# Patient Record
Sex: MALE | Race: WHITE | NOT HISPANIC OR LATINO | Employment: OTHER | URBAN - METROPOLITAN AREA
[De-identification: names, ages, dates, MRNs, and addresses within clinical notes are randomized per-mention and may not be internally consistent; named-entity substitution may affect disease eponyms.]

---

## 2021-12-06 ENCOUNTER — HOSPITAL ENCOUNTER (EMERGENCY)
Facility: HOSPITAL | Age: 41
Discharge: HOME | End: 2021-12-06
Attending: EMERGENCY MEDICINE

## 2021-12-06 VITALS
TEMPERATURE: 98 F | RESPIRATION RATE: 16 BRPM | HEART RATE: 99 BPM | WEIGHT: 217 LBS | HEIGHT: 73 IN | BODY MASS INDEX: 28.76 KG/M2 | SYSTOLIC BLOOD PRESSURE: 146 MMHG | DIASTOLIC BLOOD PRESSURE: 94 MMHG | OXYGEN SATURATION: 98 %

## 2021-12-06 DIAGNOSIS — S93.402A SPRAIN OF LEFT ANKLE, UNSPECIFIED LIGAMENT, INITIAL ENCOUNTER: Primary | ICD-10-CM

## 2021-12-06 DIAGNOSIS — S80.01XA CONTUSION OF RIGHT KNEE, INITIAL ENCOUNTER: ICD-10-CM

## 2021-12-06 DIAGNOSIS — S09.90XA CLOSED HEAD INJURY, INITIAL ENCOUNTER: ICD-10-CM

## 2021-12-06 DIAGNOSIS — M25.579 ANKLE PAIN: ICD-10-CM

## 2021-12-06 PROCEDURE — 99284 EMERGENCY DEPT VISIT MOD MDM: CPT | Mod: 25

## 2021-12-06 PROCEDURE — 63600175 PHARM REV CODE 636 W HCPCS: Performed by: PHYSICIAN ASSISTANT

## 2021-12-06 PROCEDURE — 90471 IMMUNIZATION ADMIN: CPT | Performed by: PHYSICIAN ASSISTANT

## 2021-12-06 PROCEDURE — 90715 TDAP VACCINE 7 YRS/> IM: CPT | Performed by: PHYSICIAN ASSISTANT

## 2021-12-06 RX ADMIN — CLOSTRIDIUM TETANI TOXOID ANTIGEN (FORMALDEHYDE INACTIVATED), CORYNEBACTERIUM DIPHTHERIAE TOXOID ANTIGEN (FORMALDEHYDE INACTIVATED), BORDETELLA PERTUSSIS TOXOID ANTIGEN (GLUTARALDEHYDE INACTIVATED), BORDETELLA PERTUSSIS FILAMENTOUS HEMAGGLUTININ ANTIGEN (FORMALDEHYDE INACTIVATED), BORDETELLA PERTUSSIS PERTACTIN ANTIGEN, AND BORDETELLA PERTUSSIS FIMBRIAE 2/3 ANTIGEN 0.5 ML: 5; 2; 2.5; 5; 3; 5 INJECTION, SUSPENSION INTRAMUSCULAR at 02:12

## 2022-06-04 ENCOUNTER — HOSPITAL ENCOUNTER (EMERGENCY)
Facility: HOSPITAL | Age: 42
Discharge: HOME | End: 2022-06-04
Attending: EMERGENCY MEDICINE

## 2022-06-04 VITALS
OXYGEN SATURATION: 98 % | HEART RATE: 88 BPM | BODY MASS INDEX: 29.16 KG/M2 | WEIGHT: 220 LBS | HEIGHT: 73 IN | DIASTOLIC BLOOD PRESSURE: 81 MMHG | TEMPERATURE: 98 F | RESPIRATION RATE: 17 BRPM | SYSTOLIC BLOOD PRESSURE: 154 MMHG

## 2022-06-04 DIAGNOSIS — R10.13 EPIGASTRIC PAIN: ICD-10-CM

## 2022-06-04 DIAGNOSIS — R07.9 CHEST PAIN: Primary | ICD-10-CM

## 2022-06-04 DIAGNOSIS — R03.0 ELEVATED BLOOD PRESSURE READING WITHOUT DIAGNOSIS OF HYPERTENSION: ICD-10-CM

## 2022-06-04 LAB
ALBUMIN SERPL BCP-MCNC: 3.8 G/DL (ref 3.5–5.2)
ALP SERPL-CCNC: 67 U/L (ref 55–135)
ALT SERPL W/O P-5'-P-CCNC: 23 U/L (ref 10–44)
ANION GAP SERPL CALC-SCNC: 9 MMOL/L (ref 8–16)
AST SERPL-CCNC: 15 U/L (ref 10–40)
BASOPHILS # BLD AUTO: 0.08 K/UL (ref 0–0.2)
BASOPHILS NFR BLD: 0.8 % (ref 0–1.9)
BILIRUB SERPL-MCNC: 0.4 MG/DL (ref 0.1–1)
BNP SERPL-MCNC: 15 PG/ML (ref 0–99)
BUN SERPL-MCNC: 12 MG/DL (ref 6–20)
CALCIUM SERPL-MCNC: 8.9 MG/DL (ref 8.7–10.5)
CHLORIDE SERPL-SCNC: 104 MMOL/L (ref 95–110)
CO2 SERPL-SCNC: 23 MMOL/L (ref 23–29)
CREAT SERPL-MCNC: 0.8 MG/DL (ref 0.5–1.4)
DIFFERENTIAL METHOD: ABNORMAL
EOSINOPHIL # BLD AUTO: 0.3 K/UL (ref 0–0.5)
EOSINOPHIL NFR BLD: 3.2 % (ref 0–8)
ERYTHROCYTE [DISTWIDTH] IN BLOOD BY AUTOMATED COUNT: 13.4 % (ref 11.5–14.5)
EST. GFR  (AFRICAN AMERICAN): >60 ML/MIN/1.73 M^2
EST. GFR  (NON AFRICAN AMERICAN): >60 ML/MIN/1.73 M^2
GLUCOSE SERPL-MCNC: 94 MG/DL (ref 70–110)
HCT VFR BLD AUTO: 46.7 % (ref 40–54)
HGB BLD-MCNC: 16 G/DL (ref 14–18)
IMM GRANULOCYTES # BLD AUTO: 0.08 K/UL (ref 0–0.04)
IMM GRANULOCYTES NFR BLD AUTO: 0.8 % (ref 0–0.5)
LIPASE SERPL-CCNC: 21 U/L (ref 4–60)
LYMPHOCYTES # BLD AUTO: 2.3 K/UL (ref 1–4.8)
LYMPHOCYTES NFR BLD: 23.1 % (ref 18–48)
MCH RBC QN AUTO: 28.3 PG (ref 27–31)
MCHC RBC AUTO-ENTMCNC: 34.3 G/DL (ref 32–36)
MCV RBC AUTO: 83 FL (ref 82–98)
MONOCYTES # BLD AUTO: 0.9 K/UL (ref 0.3–1)
MONOCYTES NFR BLD: 9.4 % (ref 4–15)
NEUTROPHILS # BLD AUTO: 6.2 K/UL (ref 1.8–7.7)
NEUTROPHILS NFR BLD: 62.7 % (ref 38–73)
NRBC BLD-RTO: 0 /100 WBC
PLATELET # BLD AUTO: 325 K/UL (ref 150–450)
PMV BLD AUTO: 9.1 FL (ref 9.2–12.9)
POTASSIUM SERPL-SCNC: 3.9 MMOL/L (ref 3.5–5.1)
PROT SERPL-MCNC: 6.6 G/DL (ref 6–8.4)
RBC # BLD AUTO: 5.65 M/UL (ref 4.6–6.2)
SODIUM SERPL-SCNC: 136 MMOL/L (ref 136–145)
TROPONIN I SERPL DL<=0.01 NG/ML-MCNC: <0.006 NG/ML (ref 0–0.03)
TROPONIN I SERPL DL<=0.01 NG/ML-MCNC: <0.006 NG/ML (ref 0–0.03)
TSH SERPL DL<=0.005 MIU/L-ACNC: 1.69 UIU/ML (ref 0.4–4)
WBC # BLD AUTO: 9.85 K/UL (ref 3.9–12.7)

## 2022-06-04 PROCEDURE — 93010 ELECTROCARDIOGRAM REPORT: CPT | Mod: ,,, | Performed by: INTERNAL MEDICINE

## 2022-06-04 PROCEDURE — 99285 EMERGENCY DEPT VISIT HI MDM: CPT | Mod: ,,, | Performed by: PHYSICIAN ASSISTANT

## 2022-06-04 PROCEDURE — 86803 HEPATITIS C AB TEST: CPT | Performed by: EMERGENCY MEDICINE

## 2022-06-04 PROCEDURE — 99285 EMERGENCY DEPT VISIT HI MDM: CPT | Mod: 25

## 2022-06-04 PROCEDURE — 93005 ELECTROCARDIOGRAM TRACING: CPT

## 2022-06-04 PROCEDURE — 85025 COMPLETE CBC W/AUTO DIFF WBC: CPT | Performed by: PHYSICIAN ASSISTANT

## 2022-06-04 PROCEDURE — 83880 ASSAY OF NATRIURETIC PEPTIDE: CPT | Performed by: PHYSICIAN ASSISTANT

## 2022-06-04 PROCEDURE — 96374 THER/PROPH/DIAG INJ IV PUSH: CPT

## 2022-06-04 PROCEDURE — 93010 ELECTROCARDIOGRAM REPORT: CPT | Mod: 76,,, | Performed by: INTERNAL MEDICINE

## 2022-06-04 PROCEDURE — 84484 ASSAY OF TROPONIN QUANT: CPT | Mod: 91 | Performed by: PHYSICIAN ASSISTANT

## 2022-06-04 PROCEDURE — 87389 HIV-1 AG W/HIV-1&-2 AB AG IA: CPT | Performed by: EMERGENCY MEDICINE

## 2022-06-04 PROCEDURE — 93010 EKG 12-LEAD: ICD-10-PCS | Mod: ,,, | Performed by: INTERNAL MEDICINE

## 2022-06-04 PROCEDURE — 84443 ASSAY THYROID STIM HORMONE: CPT | Performed by: PHYSICIAN ASSISTANT

## 2022-06-04 PROCEDURE — 99285 PR EMERGENCY DEPT VISIT,LEVEL V: ICD-10-PCS | Mod: ,,, | Performed by: PHYSICIAN ASSISTANT

## 2022-06-04 PROCEDURE — 96375 TX/PRO/DX INJ NEW DRUG ADDON: CPT

## 2022-06-04 PROCEDURE — 25000003 PHARM REV CODE 250: Performed by: PHYSICIAN ASSISTANT

## 2022-06-04 PROCEDURE — 96361 HYDRATE IV INFUSION ADD-ON: CPT

## 2022-06-04 PROCEDURE — 63600175 PHARM REV CODE 636 W HCPCS: Performed by: PHYSICIAN ASSISTANT

## 2022-06-04 PROCEDURE — 83690 ASSAY OF LIPASE: CPT | Performed by: EMERGENCY MEDICINE

## 2022-06-04 PROCEDURE — 80053 COMPREHEN METABOLIC PANEL: CPT | Performed by: EMERGENCY MEDICINE

## 2022-06-04 RX ORDER — FAMOTIDINE 20 MG/1
20 TABLET, FILM COATED ORAL DAILY
Qty: 14 TABLET | Refills: 0 | Status: SHIPPED | OUTPATIENT
Start: 2022-06-04 | End: 2022-09-28

## 2022-06-04 RX ORDER — MAG HYDROX/ALUMINUM HYD/SIMETH 200-200-20
15 SUSPENSION, ORAL (FINAL DOSE FORM) ORAL
Qty: 354 ML | Refills: 0 | Status: SHIPPED | OUTPATIENT
Start: 2022-06-04 | End: 2023-06-04

## 2022-06-04 RX ORDER — FAMOTIDINE 10 MG/ML
20 INJECTION INTRAVENOUS
Status: COMPLETED | OUTPATIENT
Start: 2022-06-04 | End: 2022-06-04

## 2022-06-04 RX ORDER — LIDOCAINE HYDROCHLORIDE 20 MG/ML
10 SOLUTION OROPHARYNGEAL
Status: COMPLETED | OUTPATIENT
Start: 2022-06-04 | End: 2022-06-04

## 2022-06-04 RX ORDER — MORPHINE SULFATE 4 MG/ML
4 INJECTION, SOLUTION INTRAMUSCULAR; INTRAVENOUS
Status: COMPLETED | OUTPATIENT
Start: 2022-06-04 | End: 2022-06-04

## 2022-06-04 RX ORDER — NITROGLYCERIN 0.4 MG/1
0.4 TABLET SUBLINGUAL
Status: DISCONTINUED | OUTPATIENT
Start: 2022-06-04 | End: 2022-06-04

## 2022-06-04 RX ORDER — MAG HYDROX/ALUMINUM HYD/SIMETH 200-200-20
15 SUSPENSION, ORAL (FINAL DOSE FORM) ORAL
Status: COMPLETED | OUTPATIENT
Start: 2022-06-04 | End: 2022-06-04

## 2022-06-04 RX ADMIN — MORPHINE SULFATE 4 MG: 4 INJECTION INTRAVENOUS at 03:06

## 2022-06-04 RX ADMIN — ALUMINUM HYDROXIDE, MAGNESIUM HYDROXIDE, AND SIMETHICONE 15 ML: 200; 200; 20 SUSPENSION ORAL at 04:06

## 2022-06-04 RX ADMIN — FAMOTIDINE 20 MG: 10 INJECTION INTRAVENOUS at 04:06

## 2022-06-04 RX ADMIN — LIDOCAINE HYDROCHLORIDE 10 ML: 20 SOLUTION OROPHARYNGEAL at 04:06

## 2022-06-04 RX ADMIN — SODIUM CHLORIDE 1000 ML: 0.9 INJECTION, SOLUTION INTRAVENOUS at 03:06

## 2022-06-04 NOTE — ED PROVIDER NOTES
"Encounter Date: 6/4/2022       History     Chief Complaint   Patient presents with    Chest Pain     Pt c/o chest pain that began a few hours ago.   States he feels sweaty, neck tension and leg weakness     Patient is a 41yoM who presents for chest pain; pertinent PMHx cigarette use (6pyh 1.5ppd). reports two days of intermittent chest tightness/squeezing sensation that was mild, transient and not associated with any pattern. Today while walking around museum, onset of more severe chest tightness/squeezing that has been constant for about the last 1.5 hrs. No radiation. Initially made patient feel weak with facial numbness "all over", that has since resolved. H/o anxiety and panic attacks though this felt different. Also notes some episodes of food feeling "stuck and taking awhile to go down" while eating recently.  Elevated BP readings at his PCP office though no formal HTN Dx. Borderline HLD several years ago.  No abdominal SHx. One alcoholic drink this morning, no drug use. No cardiac FHx.  The patients available PMH, PSH, Social History, medications, allergies, and triage vital signs were reviewed just prior to their medical evaluation.  A ten point review of systems was completed and is negative except as documented above.  Patient denies any other acute medical complaint.    Please be advised this text was dictated with [employer] software and may contain errors due to translation.           Review of patient's allergies indicates:   Allergen Reactions    Asa [aspirin] Swelling    Ibuprofen Swelling    Naproxen Swelling    Amoxicillin Hives    Azithromycin Hives    Sulfa (sulfonamide antibiotics) Hives     No past medical history on file.  No past surgical history on file.  No family history on file.     Review of Systems   Constitutional: Negative for chills and fever.   HENT: Negative for sore throat and trouble swallowing.    Respiratory: Negative for cough and shortness of breath.    Cardiovascular: " Positive for chest pain.   Gastrointestinal: Negative for abdominal pain, nausea (earlier, resolved) and vomiting.        +belching   Genitourinary: Negative for dysuria.   Musculoskeletal: Negative for back pain.   Skin: Negative for rash.   Neurological: Negative for weakness (earlier). Numbness: earlier, resolved.   Hematological: Does not bruise/bleed easily.       Physical Exam     Initial Vitals [06/04/22 1455]   BP Pulse Resp Temp SpO2   (!) 183/92 (!) 130 (!) 24 97.7 °F (36.5 °C) 100 %      MAP       --         Physical Exam    Nursing note and vitals reviewed.  Constitutional: He appears well-developed and well-nourished. He is not diaphoretic. He appears distressed (anxious, intermittently touching chest).   HENT:   Head: Normocephalic and atraumatic.   Right Ear: External ear normal.   Left Ear: External ear normal.   Mouth/Throat: Oropharynx is clear and moist.   Eyes: Conjunctivae and EOM are normal. Pupils are equal, round, and reactive to light. No scleral icterus.   Neck: No JVD present.   Cardiovascular: Regular rhythm and intact distal pulses.   tachycardic   Pulmonary/Chest: Breath sounds normal. No respiratory distress. He has no wheezes. He has no rhonchi. He has no rales.   Abdominal: Abdomen is soft. Bowel sounds are normal. There is abdominal tenderness (epigastrium, no RUQ). There is no rebound and no guarding.   Musculoskeletal:         General: No edema. Normal range of motion.     Neurological: He is alert and oriented to person, place, and time.   Skin: Skin is warm and dry. Capillary refill takes less than 2 seconds.   Psychiatric: He has a normal mood and affect. His behavior is normal. Judgment and thought content normal.         ED Course   Procedures  Labs Reviewed   CBC W/ AUTO DIFFERENTIAL - Abnormal; Notable for the following components:       Result Value    MPV 9.1 (*)     Immature Granulocytes 0.8 (*)     Immature Grans (Abs) 0.08 (*)     All other components within normal  limits   TROPONIN I   B-TYPE NATRIURETIC PEPTIDE   TSH   TROPONIN I   COMPREHENSIVE METABOLIC PANEL   LIPASE   HIV 1 / 2 ANTIBODY   HEPATITIS C ANTIBODY        ECG Results          EKG 12-lead (Final result)  Result time 06/05/22 12:55:04    Final result by Interface, Lab In [facility] (06/05/22 12:55:04)                 Narrative:    Test Reason : R07.9,    Vent. Rate : 113 BPM     Atrial Rate : 113 BPM     P-R Int : 172 ms          QRS Dur : 098 ms      QT Int : 322 ms       P-R-T Axes : 059 028 020 degrees     QTc Int : 441 ms    Sinus tachycardia  Otherwise normal ECG  When compared with ECG of 04-JUN-2022 15:02,  No significant change was found  Confirmed by Maida Peguero MD ([id number]) on 6/5/2022 12:54:54 PM    Referred By: [email or web address]   SELF           Confirmed By:Maida Peguero MD                             EKG 12-lead (Final result)  Result time 06/05/22 12:56:25    Final result by Interface, Lab In [facility] (06/05/22 12:56:25)                 Narrative:    Test Reason : R07.9,    Vent. Rate : 111 BPM     Atrial Rate : 111 BPM     P-R Int : 176 ms          QRS Dur : 096 ms      QT Int : 316 ms       P-R-T Axes : 065 039 041 degrees     QTc Int : 429 ms    Sinus tachycardia  Otherwise normal ECG  No previous ECGs available  Confirmed by Maida Peguero MD ([id number]) on 6/5/2022 12:56:15 PM    Referred By: [email or web address]   SELF           Confirmed By:Maida Peguero MD                            Imaging Results          US Abdomen Limited (Final result)  Result time 06/04/22 20:47:39    Final result by Kenneth Noonan MD (06/04/22 20:47:39)                 Impression:      No significant sonographic abnormality.      Electronically signed by: Kenneth Noonan MD  Date:    06/04/2022  Time:    20:47             Narrative:    EXAMINATION:  US ABDOMEN LIMITED    CLINICAL HISTORY:  RUQ;    TECHNIQUE:  Limited ultrasound of the right upper quadrant of the abdomen targeted on the biliary system.    COMPARISON:  None.    FINDINGS:  Gallbladder:  No calculi, wall thickening, or pericholecystic fluid.  No sonographic Whiteside's sign.    Biliary system: The common duct is not dilated, measuring 2 mm.  No intrahepatic ductal dilatation.    Miscellaneous: No upper abdominal ascites.  Midline structures including the pancreas are not well visualized secondary to shadowing from bowel gas.  No right hydronephrosis.                               X-Ray Chest AP Portable (Final result)  Result time 06/04/22 15:43:09    Final result by Hua Ellis MD (06/04/22 15:43:09)                 Impression:      No acute abnormality.      Electronically signed by: Hua Ellis MD  Date:    06/04/2022  Time:    15:43             Narrative:    EXAMINATION:  XR CHEST AP PORTABLE    CLINICAL HISTORY:  Chest Pain;    TECHNIQUE:  Single frontal view of the chest was performed.    COMPARISON:  July 31, 2007    FINDINGS:  Lungs are clear.  No effusion or pneumothorax.    Unremarkable cardiomediastinal silhouette.    No acute bone abnormality.                                 Medications   morphine injection 4 mg (4 mg Intravenous Given 6/4/22 1537)   sodium chloride 0.9% bolus 1,000 mL (0 mLs Intravenous Stopped 6/4/22 1637)   famotidine (PF) injection 20 mg (20 mg Intravenous Given 6/4/22 1624)   aluminum-magnesium hydroxide-simethicone 200-200-20 mg/5 mL suspension 15 mL (15 mLs Oral Given 6/4/22 1623)   LIDOcaine HCl 2% oral solution 10 mL (10 mLs Oral Given 6/4/22 1623)     Medical Decision Making:   History:   I obtained history from: someone other than patient.  Old Medical Records: I decided to obtain old medical records.  Old Records Summarized: records from clinic visits and records from previous admission(s).  Initial Assessment:   Patient presents for chest tightness/squeezing with belching/transient nausea/weakness. +anxious, tachycardic, afebrile +epigastric pain  Differential Diagnosis:   Esophageal spasm, symptomatic cholelithiasis, pancreatitis, prinzmetal  angina  Physical exam and history taking lower clinical suspicion for ACS, Aortic dissection, pneumothorax, PE, perforated bowel  Independently Interpreted Test(s):   I have ordered and independently interpreted X-rays - see prior notes.  I have ordered and independently interpreted EKG Reading(s) - see prior notes  Clinical Tests:   Lab Tests: Ordered and Reviewed  Radiological Study: Ordered and Reviewed  Medical Tests: Ordered and Reviewed             ED Course as of 06/05/22 1333   Sat Jun 04, 2022   1520 EKG 12-lead  EKG shows sinus tach rate 111bpm, LAE, no acute ischemic changes [MF]   1529 X-Ray Chest AP Portable  No acute findings  [MF]   1546 BP(!): 177/93  No h/o HTN, patient anxious [MF]   1556 Pulse(!): 116  105 repeat [MF]   1559 CBC auto differential(!)  unremarkable [MF]   1604 After morphine, minimal pain improvement. Frequent belching. Adding GI cocktail, pepcid [MF]   1625 BNP: 15 [MF]   1625 Troponin I: <0.006 [MF]   1645 TSH: 1.690 [MF]   1818 Lipase: 21 [MF]   1818 Comprehensive metabolic panel  unremarkable [MF]   1818 Troponin I: <0.006 [MF]   1836 Improvement of pain on re-assessment,  to epigastrium. Will obtain US for emergent gallbladder pathology though less likely [MF]   2051 US Abdomen Limited  No abnormal findings, neg sonographic murphys sign [MF]      ED Course User Index  [MF] Maddison Gabriel PA-C         Continued symptom resolution, normalization of vitals.  Doubt emergent etiology of symptoms given workup- likely GI in nature. F/u to PCP next week at home, Rx pepcid/maalox for home, smoking cessation discussed.  Patient agreed to plan of care and voiced understanding. Discharged in stable condition with strict ED return precautions.    Maddison Gabriel PA-C  06/05/2022    I discussed the following case, diagnosis and plan of care with attending physician.      Clinical Impression:   Final diagnoses:  [R07.9] Chest pain (Primary)  [R03.0] Elevated blood pressure  reading without diagnosis of hypertension  [R10.13] Epigastric pain          ED Disposition Condition    Discharge Stable        ED Prescriptions     Medication Sig Dispense Start Date End Date Auth. Provider    famotidine (PEPCID) 20 MG tablet Take 1 tablet (20 mg total) by mouth once daily at 6am. for 14 days 14 tablet 6/4/2022 6/18/2022 Maddison Gabriel PA-C    aluminum-magnesium hydroxide-simethicone (MAALOX) 200-200-20 mg/5 mL Susp Take 15 mLs by mouth 4 (four) times daily before meals and nightly. 354 mL 6/4/2022 6/4/2023 Maddison Gabriel PA-C        Follow-up Information    None          Maddison Gabriel PA-C  06/05/22 [id number]

## 2022-06-05 NOTE — DISCHARGE INSTRUCTIONS
-follow up with primary provider regarding blood pressure, cholesterol, etc. Follow up for chest pain  Reduce or stop smoking cigarettes  Return to ER for any severe or new symptoms despite medications

## 2022-06-06 LAB
HCV AB SERPL QL IA: NEGATIVE
HIV 1+2 AB+HIV1 P24 AG SERPL QL IA: NEGATIVE

## 2022-06-07 ENCOUNTER — OFFICE VISIT (OUTPATIENT)
Dept: FAMILY MEDICINE | Facility: CLINIC | Age: 42
End: 2022-06-07

## 2022-06-07 DIAGNOSIS — R07.89 ATYPICAL CHEST PAIN: Primary | ICD-10-CM

## 2022-06-07 PROCEDURE — 99213 OFFICE O/P EST LOW 20 MIN: CPT | Mod: 95,,, | Performed by: FAMILY MEDICINE

## 2022-06-07 PROCEDURE — 99213 PR OFFICE/OUTPT VISIT, EST, LEVL III, 20-29 MIN: ICD-10-PCS | Mod: 95,,, | Performed by: FAMILY MEDICINE

## 2022-06-07 NOTE — PROGRESS NOTES
Chief Complaint   Patient presents with    Chest Pain        HPI:  41 male recently seen in ED for what appears to be non-cardiac chest pain - had visit to [facility] days ago and experienced acute chest pain - workup was negative.  Despite that, still experiencing chest pain as he is now being treated for GERD.    Labs all normal from ED visit    ROS: bloating type abdominal pain      General:  AOx3, well nourished and developed in no acute distress  Eyes:  PERRLA, EOMI, vision intact grossly  Neck:  trachea midline with no masses or thyromegaly  Musculoskeletal:  Normal posture.  Normal muscular development.  Neurological:  CN II-XII grossly intact based off of video interaction  Psych:  Normal mood and affect.  Able to demonstrate good judgement and personal insight.      Assessment/Plan:    Atypical chest pain     complete workup by assessing A1C and lipids.  Follow up in office.      The patient location is: home  The chief complaint leading to consultation is: chest pain    Visit type: audiovisual    Face to Face time with patient: 15  15 minutes of total time spent on the encounter, which includes face to face time and non-face to face time preparing to see the patient (eg, review of tests), Obtaining and/or reviewing separately obtained history, Documenting clinical information in the electronic or other health record, Independently interpreting results (not separately reported) and communicating results to the patient/family/caregiver, or Care coordination (not separately reported).         Each patient to whom he or she provides medical services by telemedicine is:  (1) informed of the relationship between the physician and patient and the respective role of any other health care provider with respect to management of the patient; and (2) notified that he or she may decline to receive medical services by telemedicine and may withdraw from such care at any time.    Notes:

## 2022-06-08 ENCOUNTER — LAB VISIT (OUTPATIENT)
Dept: LAB | Facility: HOSPITAL | Age: 42
End: 2022-06-08
Attending: FAMILY MEDICINE

## 2022-06-08 DIAGNOSIS — R07.89 ATYPICAL CHEST PAIN: ICD-10-CM

## 2022-06-08 LAB
CHOLEST SERPL-MCNC: 201 MG/DL (ref 120–199)
CHOLEST/HDLC SERPL: 5.2 {RATIO} (ref 2–5)
ESTIMATED AVG GLUCOSE: 108 MG/DL (ref 68–131)
HBA1C MFR BLD: 5.4 % (ref 4–5.6)
HDLC SERPL-MCNC: 39 MG/DL (ref 40–75)
HDLC SERPL: 19.4 % (ref 20–50)
LDLC SERPL CALC-MCNC: 137.6 MG/DL (ref 63–159)
NONHDLC SERPL-MCNC: 162 MG/DL
TRIGL SERPL-MCNC: 122 MG/DL (ref 30–150)

## 2022-06-08 PROCEDURE — 83036 HEMOGLOBIN GLYCOSYLATED A1C: CPT | Performed by: FAMILY MEDICINE

## 2022-06-08 PROCEDURE — 80061 LIPID PANEL: CPT | Performed by: FAMILY MEDICINE

## 2022-06-08 PROCEDURE — 36415 COLL VENOUS BLD VENIPUNCTURE: CPT | Performed by: FAMILY MEDICINE

## 2022-06-09 ENCOUNTER — OFFICE VISIT (OUTPATIENT)
Dept: FAMILY MEDICINE | Facility: CLINIC | Age: 42
End: 2022-06-09

## 2022-06-09 VITALS
BODY MASS INDEX: 28.37 KG/M2 | OXYGEN SATURATION: 99 % | HEART RATE: 99 BPM | SYSTOLIC BLOOD PRESSURE: 130 MMHG | DIASTOLIC BLOOD PRESSURE: 70 MMHG | WEIGHT: 215 LBS | TEMPERATURE: 99 F

## 2022-06-09 DIAGNOSIS — F41.9 ANXIETY: ICD-10-CM

## 2022-06-09 DIAGNOSIS — F17.210 CIGARETTE NICOTINE DEPENDENCE WITHOUT COMPLICATION: ICD-10-CM

## 2022-06-09 DIAGNOSIS — K21.9 GASTROESOPHAGEAL REFLUX DISEASE WITHOUT ESOPHAGITIS: ICD-10-CM

## 2022-06-09 DIAGNOSIS — R13.13 PHARYNGEAL DYSPHAGIA: ICD-10-CM

## 2022-06-09 PROCEDURE — 99406 PR TOBACCO USE CESSATION INTERMEDIATE 3-10 MINUTES: ICD-10-PCS | Mod: S$GLB,,, | Performed by: FAMILY MEDICINE

## 2022-06-09 PROCEDURE — 99214 OFFICE O/P EST MOD 30 MIN: CPT | Mod: 25,S$GLB,, | Performed by: FAMILY MEDICINE

## 2022-06-09 PROCEDURE — 99214 PR OFFICE/OUTPT VISIT, EST, LEVL IV, 30-39 MIN: ICD-10-PCS | Mod: 25,S$GLB,, | Performed by: FAMILY MEDICINE

## 2022-06-09 PROCEDURE — 99406 BEHAV CHNG SMOKING 3-10 MIN: CPT | Mod: S$GLB,,, | Performed by: FAMILY MEDICINE

## 2022-06-09 RX ORDER — SERTRALINE HYDROCHLORIDE 50 MG/1
50 TABLET, FILM COATED ORAL DAILY
Qty: 30 TABLET | Refills: 11 | Status: SHIPPED | OUTPATIENT
Start: 2022-06-09 | End: 2022-09-28 | Stop reason: SDUPTHER

## 2022-06-09 RX ORDER — PROPRANOLOL HYDROCHLORIDE 10 MG/1
10 TABLET ORAL 3 TIMES DAILY
Qty: 90 TABLET | Refills: 11 | Status: SHIPPED | OUTPATIENT
Start: 2022-06-09 | End: 2022-09-28

## 2022-06-09 RX ORDER — PANTOPRAZOLE SODIUM 40 MG/1
40 TABLET, DELAYED RELEASE ORAL DAILY
Qty: 30 TABLET | Refills: 11 | Status: SHIPPED | OUTPATIENT
Start: 2022-06-09 | End: 2023-06-09

## 2022-06-09 NOTE — PROGRESS NOTES
Family Medicine Note  [facility]    Chief Complaint   Patient presents with    Anxiety    Results    Gastroesophageal Reflux        HPI:  Office follow up for GI pain after ED visit showed good heart function.  No sign of DM.  Cholesterol ok, but could be improved with increased exercise.  Currently on mylanta and pepcid.    Has had episodes of dysphagia, sensation of food sticking    Also has somewhat severe anxiety - panic/agoraphobia.  Treated this with zoloft and xanax.    ROS: abdominal pain, dysphagia, anxiety    Vitals:    06/09/22 1505   BP: 130/70   Pulse: 99   Temp: 98.9 °F (37.2 °C)   SpO2: 99%   Weight: 97.5 kg (215 lb)      Body mass index is 28.37 kg/m².      General:  AOx3, well nourished and developed in no acute distress  Eyes:  PERRLA, EOMI, vision intact grossly  ENT:  normal hearing, moist oral mucosa  Neck:  trachea midline with no masses or thyromegaly  Heart:  RRR, no murmurs.  No edema noted, extremities warm and well perfused  Lungs:  clear to auscultation bilaterally with symmetric chest movement  Abdomen:  Soft, nontender, nondistended.  Normal bowel sounds  Musculoskeletal:  Normal gait.  Normal posture.  Normal muscular development with no joint swelling.  Neurological:  CN II-XII grossly intact. Symmetric strength and sensation  Psych:  Normal mood and affect.  Able to demonstrate good judgement and personal insight.      Assessment/Plan:    Pharyngeal dysphagia    Gastroesophageal reflux disease without esophagitis    Anxiety    Cigarette nicotine dependence without complication       1. Definitely would benefit from PPI and follow up with GI for likely scope  2.  Resume zoloft and propranolol as anxiety is largely contributing to this    3.  Spent >5 minutes counseling patient on importance of smoking cessation through use of social networks, nicotine replacement therapy, and use of prescription medication if desire.

## 2022-06-09 NOTE — PATIENT INSTRUCTIONS
Switch from mylanta to protonix  Resume zoloft, and use propranolol as needed for anxiety    Will place referral to Dr. Choudhury for likely scope    Change diet - unprocessed, whole foods    STOP SMOKING - change social triggers around when you smoke    Consider Amadeo Chi to reduce baseline anxiety    Follow up 3 months

## 2022-06-20 ENCOUNTER — HOSPITAL ENCOUNTER (EMERGENCY)
Facility: HOSPITAL | Age: 42
Discharge: HOME | End: 2022-06-20
Attending: EMERGENCY MEDICINE

## 2022-06-20 VITALS
BODY MASS INDEX: 25.84 KG/M2 | DIASTOLIC BLOOD PRESSURE: 92 MMHG | OXYGEN SATURATION: 99 % | WEIGHT: 195 LBS | SYSTOLIC BLOOD PRESSURE: 151 MMHG | RESPIRATION RATE: 17 BRPM | TEMPERATURE: 98 F | HEART RATE: 109 BPM | HEIGHT: 73 IN

## 2022-06-20 DIAGNOSIS — R07.9 CHEST PAIN: ICD-10-CM

## 2022-06-20 DIAGNOSIS — K44.9 HIATAL HERNIA: Primary | ICD-10-CM

## 2022-06-20 LAB
ALBUMIN SERPL BCP-MCNC: 4.7 G/DL (ref 3.5–5.2)
ALP SERPL-CCNC: 65 U/L (ref 55–135)
ALT SERPL W/O P-5'-P-CCNC: 27 U/L (ref 10–44)
ANION GAP SERPL CALC-SCNC: 8 MMOL/L (ref 8–16)
AST SERPL-CCNC: 23 U/L (ref 10–40)
BASOPHILS # BLD AUTO: 0.04 K/UL (ref 0–0.2)
BASOPHILS NFR BLD: 0.5 % (ref 0–1.9)
BILIRUB SERPL-MCNC: 1.2 MG/DL (ref 0.1–1)
BUN SERPL-MCNC: 11 MG/DL (ref 6–20)
CALCIUM SERPL-MCNC: 9.1 MG/DL (ref 8.7–10.5)
CHLORIDE SERPL-SCNC: 101 MMOL/L (ref 95–110)
CO2 SERPL-SCNC: 24 MMOL/L (ref 23–29)
CREAT SERPL-MCNC: 0.9 MG/DL (ref 0.5–1.4)
CREAT SERPL-MCNC: 1 MG/DL (ref 0.5–1.4)
DIFFERENTIAL METHOD: ABNORMAL
EOSINOPHIL # BLD AUTO: 0 K/UL (ref 0–0.5)
EOSINOPHIL NFR BLD: 0.1 % (ref 0–8)
ERYTHROCYTE [DISTWIDTH] IN BLOOD BY AUTOMATED COUNT: 13.3 % (ref 11.5–14.5)
EST. GFR  (AFRICAN AMERICAN): >60 ML/MIN/1.73 M^2
EST. GFR  (NON AFRICAN AMERICAN): >60 ML/MIN/1.73 M^2
GLUCOSE SERPL-MCNC: 113 MG/DL (ref 70–110)
HCT VFR BLD AUTO: 44.6 % (ref 40–54)
HGB BLD-MCNC: 15.2 G/DL (ref 14–18)
IMM GRANULOCYTES # BLD AUTO: 0.03 K/UL (ref 0–0.04)
IMM GRANULOCYTES NFR BLD AUTO: 0.4 % (ref 0–0.5)
LIPASE SERPL-CCNC: 29 U/L (ref 4–60)
LYMPHOCYTES # BLD AUTO: 0.9 K/UL (ref 1–4.8)
LYMPHOCYTES NFR BLD: 11 % (ref 18–48)
MAGNESIUM SERPL-MCNC: 2 MG/DL (ref 1.6–2.6)
MCH RBC QN AUTO: 28.2 PG (ref 27–31)
MCHC RBC AUTO-ENTMCNC: 34.1 G/DL (ref 32–36)
MCV RBC AUTO: 83 FL (ref 82–98)
MONOCYTES # BLD AUTO: 0.6 K/UL (ref 0.3–1)
MONOCYTES NFR BLD: 7.8 % (ref 4–15)
NEUTROPHILS # BLD AUTO: 6.6 K/UL (ref 1.8–7.7)
NEUTROPHILS NFR BLD: 80.2 % (ref 38–73)
NRBC BLD-RTO: 0 /100 WBC
PLATELET # BLD AUTO: 320 K/UL (ref 150–450)
PMV BLD AUTO: 8.9 FL (ref 9.2–12.9)
POTASSIUM SERPL-SCNC: 3.7 MMOL/L (ref 3.5–5.1)
PROT SERPL-MCNC: 7.8 G/DL (ref 6–8.4)
RBC # BLD AUTO: 5.39 M/UL (ref 4.6–6.2)
SAMPLE: NORMAL
SODIUM SERPL-SCNC: 133 MMOL/L (ref 136–145)
TROPONIN I SERPL DL<=0.01 NG/ML-MCNC: <0.03 NG/ML
WBC # BLD AUTO: 8.24 K/UL (ref 3.9–12.7)

## 2022-06-20 PROCEDURE — 84484 ASSAY OF TROPONIN QUANT: CPT | Performed by: EMERGENCY MEDICINE

## 2022-06-20 PROCEDURE — 85025 COMPLETE CBC W/AUTO DIFF WBC: CPT | Performed by: EMERGENCY MEDICINE

## 2022-06-20 PROCEDURE — 83735 ASSAY OF MAGNESIUM: CPT | Performed by: EMERGENCY MEDICINE

## 2022-06-20 PROCEDURE — 93005 ELECTROCARDIOGRAM TRACING: CPT | Performed by: SPECIALIST

## 2022-06-20 PROCEDURE — 93010 ELECTROCARDIOGRAM REPORT: CPT | Mod: ,,, | Performed by: SPECIALIST

## 2022-06-20 PROCEDURE — 25500020 PHARM REV CODE 255: Performed by: EMERGENCY MEDICINE

## 2022-06-20 PROCEDURE — 25000003 PHARM REV CODE 250: Performed by: EMERGENCY MEDICINE

## 2022-06-20 PROCEDURE — 99285 EMERGENCY DEPT VISIT HI MDM: CPT | Mod: 25

## 2022-06-20 PROCEDURE — 93010 EKG 12-LEAD: ICD-10-PCS | Mod: ,,, | Performed by: SPECIALIST

## 2022-06-20 PROCEDURE — C9113 INJ PANTOPRAZOLE SODIUM, VIA: HCPCS | Performed by: EMERGENCY MEDICINE

## 2022-06-20 PROCEDURE — 96374 THER/PROPH/DIAG INJ IV PUSH: CPT

## 2022-06-20 PROCEDURE — 83690 ASSAY OF LIPASE: CPT | Performed by: EMERGENCY MEDICINE

## 2022-06-20 PROCEDURE — 63600175 PHARM REV CODE 636 W HCPCS: Performed by: EMERGENCY MEDICINE

## 2022-06-20 PROCEDURE — 96375 TX/PRO/DX INJ NEW DRUG ADDON: CPT

## 2022-06-20 PROCEDURE — 80053 COMPREHEN METABOLIC PANEL: CPT | Performed by: EMERGENCY MEDICINE

## 2022-06-20 RX ORDER — PANTOPRAZOLE SODIUM 40 MG/10ML
40 INJECTION, POWDER, LYOPHILIZED, FOR SOLUTION INTRAVENOUS
Status: COMPLETED | OUTPATIENT
Start: 2022-06-20 | End: 2022-06-20

## 2022-06-20 RX ORDER — LORAZEPAM 2 MG/ML
0.5 INJECTION INTRAMUSCULAR
Status: COMPLETED | OUTPATIENT
Start: 2022-06-20 | End: 2022-06-20

## 2022-06-20 RX ORDER — SODIUM CHLORIDE 9 MG/ML
1000 INJECTION, SOLUTION INTRAVENOUS
Status: COMPLETED | OUTPATIENT
Start: 2022-06-20 | End: 2022-06-20

## 2022-06-20 RX ORDER — SODIUM CHLORIDE 0.9 % (FLUSH) 0.9 %
10 SYRINGE (ML) INJECTION
Status: DISCONTINUED | OUTPATIENT
Start: 2022-06-20 | End: 2022-06-20 | Stop reason: HOSPADM

## 2022-06-20 RX ADMIN — IOHEXOL 100 ML: 350 INJECTION, SOLUTION INTRAVENOUS at 11:06

## 2022-06-20 RX ADMIN — LORAZEPAM 0.5 MG: 2 INJECTION, SOLUTION INTRAMUSCULAR; INTRAVENOUS at 10:06

## 2022-06-20 RX ADMIN — PANTOPRAZOLE SODIUM 40 MG: 40 INJECTION, POWDER, FOR SOLUTION INTRAVENOUS at 10:06

## 2022-06-20 RX ADMIN — SODIUM CHLORIDE 1000 ML: 0.9 INJECTION, SOLUTION INTRAVENOUS at 10:06

## 2022-06-20 NOTE — ED PROVIDER NOTES
Encounter Date: 2022       History     Chief Complaint   Patient presents with    Abdominal Pain     Pt here with c/o abd pain x 3 weeks. Seen in the ED without results. Has not followed up with GI. Reports diarrhea and feeling like his abd is going to explode. Pt is AAO.      Patient presents complaining of epigastric discomfort that is been intermittent ongoing the last 3 weeks.  At the worst symptoms are moderate.  Patient complains of burning and pain to his abdomen.  Patient states he has had greater than 10 lb weight loss.  Patient had previous cardiac workup at outside facility.  Nothing really makes it better or worse.        Review of patient's allergies indicates:   Allergen Reactions    Asa [aspirin] Swelling    Ibuprofen Swelling    Naproxen Swelling    Amoxicillin Hives    Azithromycin Hives    Sulfa (sulfonamide antibiotics) Hives     No past medical history on file.  No past surgical history on file.  Family History   Problem Relation Age of Onset    Cancer Father         Stage 2 prostate cancer - born 3    Depression Sister      Social History     Tobacco Use    Smoking status: Current Every Day Smoker     Packs/day: 1.50     Years: 4.00     Pack years: 6.00     Types: Cigarettes     Start date: 2018     Last attempt to quit: 2022     Years since quittin.0    Smokeless tobacco: Never Used    Tobacco comment: Smoked when i was younger, quit for 9 years. Started smoking again    Substance Use Topics    Alcohol use: Yes     Alcohol/week: 3.0 standard drinks     Types: 3 Glasses of wine per week    Drug use: Not Currently     Review of Systems   All other systems reviewed and are negative.      Physical Exam     Initial Vitals [22 1004]   BP Pulse Resp Temp SpO2   (!) 160/113 (!) 120 19 97.7 °F (36.5 °C) 100 %      MAP       --         Physical Exam    Nursing note and vitals reviewed.  Constitutional: He appears well-developed and well-nourished. He is not  diaphoretic. No distress.   Pleasant, polite.   HENT:   Head: Normocephalic and atraumatic.   Mouth/Throat: Oropharynx is clear and moist.   Eyes: EOM are normal.   Neck: Neck supple.   Normal range of motion.  Cardiovascular: Normal rate, regular rhythm, normal heart sounds and intact distal pulses.   Pulmonary/Chest: Breath sounds normal. No respiratory distress.   Abdominal: Abdomen is soft. He exhibits no distension. There is no abdominal tenderness.   Musculoskeletal:         General: Normal range of motion.      Cervical back: Normal range of motion and neck supple.     Neurological: He is alert and oriented to person, place, and time. He has normal strength.   Skin: Skin is warm and dry.   Psychiatric: He has a normal mood and affect. His behavior is normal. Judgment and thought content normal.         ED Course   Procedures  Labs Reviewed   COMPREHENSIVE METABOLIC PANEL - Abnormal; Notable for the following components:       Result Value    Sodium 133 (*)     Glucose 113 (*)     Total Bilirubin 1.2 (*)     All other components within normal limits   CBC W/ AUTO DIFFERENTIAL - Abnormal; Notable for the following components:    MPV 8.9 (*)     Lymph # 0.9 (*)     Gran % 80.2 (*)     Lymph % 11.0 (*)     All other components within normal limits    Narrative:     Recoll. [id number] by [email or web address] at 06/20/2022 11:27, reason:   clotted--talked to er nurse for recollect   MAGNESIUM   TROPONIN I   LIPASE   ISTAT CREATININE        ECG Results          EKG 12-LEAD (In process)  Result time 06/20/22 10:40:56    In process by Interface, Lab In [facility] (06/20/22 10:40:56)                 Narrative:    Test Reason : R07.9,    Vent. Rate : 103 BPM     Atrial Rate : 103 BPM     P-R Int : 204 ms          QRS Dur : 098 ms      QT Int : 342 ms       P-R-T Axes : 056 038 044 degrees     QTc Int : 448 ms    Sinus tachycardia  Otherwise normal ECG  When compared with ECG of 04-JUN-2022 15:32,  No significant change was  found    Referred By: [email or web address]   SELF           Confirmed By:                             Imaging Results          CT Abdomen Pelvis With Contrast (Final result)  Result time 06/20/22 12:09:59    Final result by Clare Lerner MD (06/20/22 12:09:59)                 Narrative:    All CT scans at this facility used dose modulation, iterative reconstruction and/or weight-based dosing when appropriate to reduce radiation doses  as low as reasonably achievable.    HISTORY: Abdominal pain    FINDINGS: Axial postcontrast imaging was performed with 100 mL Omnipaque 350 IV contrast .    CT ABDOMEN: The lung bases are clear. There is no pericardial effusion.    There is a moderate size hiatal hernia. There is wall thickening of the hernia which may be secondary to reflux. Correlation with EGD is recommended.    The liver, spleen, pancreas and adrenal glands are normal.    The kidneys enhance symmetrically without hydronephrosis or calculi. There is no perinephric stranding.    There are no thick-walled or dilated bowel loops. The appendix is normal. There is no mesenteric or retroperitoneal adenopathy.    CT PELVIS: The bladder and prostate gland are normal. There is no pelvic adenopathy or free fluid. There are no acute osseous abnormalities.    IMPRESSION: Moderate size hiatal hernia with wall thickening which may be secondary to reflux. Correlation with EGD is recommended.    No acute abnormality within the abdomen or pelvis    Electronically signed by:  Clare Lerner MD  6/20/2022 12:09 PM CDT Workstation: [id number]                             CTA Chest Non-Coronary (PE Study) (Final result)  Result time 06/20/22 12:05:38    Final result by Clare Lerner MD (06/20/22 12:05:38)                 Narrative:    All CT scans at this facility used dose modulation, iterative reconstruction and/or weight-based dosing when appropriate to reduce radiation doses  as low as reasonably achievable.    HISTORY:  Abdominal pain and diarrhea.    FINDINGS: Thin axial imaging through the chest was performed with 100 mL Omnipaque 350 IV contrast, with sagittal and coronal reformatted images and 3-D reconstructions performed on an independent workstation, with images stored in the patient's permanent electronic medical record.    This is a high-grade quality study for the evaluation of pulmonary embolism. The pulmonary arteries are normal in appearance without pulmonary emboli noted up to the subsegmental level, noting limitations of CT technique for identifying small isolated subsegmental emboli.    The aorta is normal in caliber. There is no hilar, mediastinal or axillary adenopathy.  There are no pulmonary nodules, infiltrates or pleural effusions. The trachea and bronchi are normal.    There is a moderate size hiatal hernia.    The visualized portion of the upper abdomen is unremarkable. There are no acute osseous abnormalities.    IMPRESSION: No CT evidence pulmonary emboli    Moderate size hiatal hernia.    Electronically signed by:  Clare Lerner MD  6/20/2022 12:05 PM CDT Workstation: [id number]                             X-ray Chest AP Portable (Final result)  Result time 06/20/22 10:40:57    Final result by Jacinto Cobb MD (06/20/22 10:40:57)                 Narrative:    Chest single view    Clinical data:Abdominal pain.    FINDINGS: AP view of the chest demonstrates no cardiac, pulmonary, or osseous abnormalities.    IMPRESSION:  1. Normal chest single view.    Electronically signed by:  Jacinto Cobb MD  6/20/2022 10:40 AM CDT Workstation: [id number]                               Medications   0.9%  NaCl infusion (1,000 mLs Intravenous New Bag 6/20/22 1058)   pantoprazole injection 40 mg (40 mg Intravenous Given 6/20/22 1058)   lorazepam injection 0.5 mg (0.5 mg Intravenous Given 6/20/22 1058)   iohexoL (OMNIPAQUE 350) injection 100 mL (100 mLs Intravenous Given 6/20/22 1143)     Medical Decision  Making:   Initial Assessment:   No apparent distress  Differential Diagnosis:   Considerations include but are not limited to ACS, reflux, GERD, hiatal hernia, pulmonary embolism, colitis, pancreatitis, acute cholecystitis, obstruction  Independently Interpreted Test(s):   I have ordered and independently interpreted EKG Reading(s) - see summary below       <> Summary of EKG Reading(s): EKG is regular rate and rhythm without ST elevation  Clinical Tests:   Lab Tests: Reviewed and Ordered  Radiological Study: Ordered and Reviewed  Medical Tests: Reviewed and Ordered  ED Management:  In the emergency department patient found to have no evidence of pulmonary embolism or obstruction on CT scan however there is evidence of hiatal hernia which likely is etiology patient's discomfort and pain.  Patient does have GI follow-up tomorrow and he was encouraged to make this appointment.  Patient's other blood work was reviewed and is within normal limits.  He did receive Protonix therapy in the emergency department.  He was counseled on reasons to return.  Patient was discharged stable condition.  Detailed return precautions discussed.                      Clinical Impression:   Final diagnoses:  [R07.9] Chest pain  [K44.9] Hiatal hernia (Primary)          ED Disposition Condition    Discharge Stable        ED Prescriptions     None        Follow-up Information     Follow up With Specialties Details Why Contact Info    Skip Taylor MD Family Medicine In 1 week  [address] [facility] [address] [phone]      Your gastroenterologist as scheduled               Brennan London MD  06/20/22 2126

## 2022-09-28 ENCOUNTER — OFFICE VISIT (OUTPATIENT)
Dept: FAMILY MEDICINE | Facility: CLINIC | Age: 42
End: 2022-09-28

## 2022-09-28 VITALS
RESPIRATION RATE: 18 BRPM | WEIGHT: 215.69 LBS | HEART RATE: 68 BPM | HEIGHT: 73 IN | BODY MASS INDEX: 28.59 KG/M2 | OXYGEN SATURATION: 96 % | SYSTOLIC BLOOD PRESSURE: 138 MMHG | TEMPERATURE: 98 F | DIASTOLIC BLOOD PRESSURE: 74 MMHG

## 2022-09-28 DIAGNOSIS — F41.9 ANXIETY: Primary | ICD-10-CM

## 2022-09-28 DIAGNOSIS — R10.30 LOWER ABDOMINAL PAIN: ICD-10-CM

## 2022-09-28 DIAGNOSIS — M76.892 TENDINITIS OF LEFT HIP FLEXOR: ICD-10-CM

## 2022-09-28 DIAGNOSIS — R13.13 PHARYNGEAL DYSPHAGIA: ICD-10-CM

## 2022-09-28 PROCEDURE — 99214 OFFICE O/P EST MOD 30 MIN: CPT | Mod: S$GLB,,, | Performed by: FAMILY MEDICINE

## 2022-09-28 PROCEDURE — 99214 PR OFFICE/OUTPT VISIT, EST, LEVL IV, 30-39 MIN: ICD-10-PCS | Mod: S$GLB,,, | Performed by: FAMILY MEDICINE

## 2022-09-28 RX ORDER — SERTRALINE HYDROCHLORIDE 100 MG/1
100 TABLET, FILM COATED ORAL DAILY
Qty: 30 TABLET | Refills: 11 | Status: SHIPPED | OUTPATIENT
Start: 2022-09-28 | End: 2023-09-28

## 2022-09-28 NOTE — PROGRESS NOTES
"Family Medicine Note  [facility]    Chief Complaint   Patient presents with    Follow-up     3 month new meds and hernia        HPI:  dysphagia in improving after endoscopy - had sliding hiatal hernial and narrowing of esophagus - this was dilated    Anxiety - started on zoloft - feels fair amount of mental improvement.  Propranolol caused some fatigue.    Is gradually experiencing lower abdominal pain.  Sensation of pulling.  Unclear if this is GI related    ROS: abdominal pain    Vitals:    09/28/22 1104   BP: 138/74   BP Location: Left arm   Patient Position: Sitting   BP Method: Medium (Manual)   Pulse: 68   Resp: 18   Temp: 98.2 °F (36.8 °C)   TempSrc: Temporal   SpO2: 96%   Weight: 97.8 kg (215 lb 11.2 oz)   Height: 6' 1" (1.854 m)      Body mass index is 28.46 kg/m².      General:  AOx3, well nourished and developed in no acute distress  Eyes:  PERRLA, EOMI, vision intact grossly  ENT:  normal hearing, moist oral mucosa  Neck:  trachea midline with no masses or thyromegaly  Heart:  RRR, no murmurs.  No edema noted, extremities warm and well perfused  Lungs:  clear to auscultation bilaterally with symmetric chest movement  Abdomen:  Soft, nontender, nondistended.  Normal bowel sounds  Musculoskeletal:  Normal gait.  Normal posture.  Normal muscular development with no joint swelling.  Scant tension to palpation of hip flexors  Neurological:  CN II-XII grossly intact. Symmetric strength and sensation  Psych:  Normal mood and affect.  Able to demonstrate good judgement and personal insight.      Assessment/Plan:    Anxiety    Pharyngeal dysphagia    Lower abdominal pain    Tendinitis of left hip flexor        Improving, increase dose to 100mg  Improving, continue therapy  This may be hip flexor strain.  Advise on stretching and topical therapy, may consider US if not improved      "

## 2022-09-28 NOTE — PATIENT INSTRUCTIONS
Get ultrasound to check out for hernia  Double current dose of zoloft to 100mg    Massage hip flexor with topical CBD, and start hip flexor stretches.  If not better, will get ultrasound to check for hernia

## 2022-09-29 ENCOUNTER — PATIENT MESSAGE (OUTPATIENT)
Dept: FAMILY MEDICINE | Facility: CLINIC | Age: 42
End: 2022-09-29

## 2022-10-03 ENCOUNTER — PATIENT MESSAGE (OUTPATIENT)
Dept: FAMILY MEDICINE | Facility: CLINIC | Age: 42
End: 2022-10-03

## 2022-10-05 ENCOUNTER — PATIENT MESSAGE (OUTPATIENT)
Dept: FAMILY MEDICINE | Facility: CLINIC | Age: 42
End: 2022-10-05

## 2022-10-05 DIAGNOSIS — R10.30 LOWER ABDOMINAL PAIN: Primary | ICD-10-CM

## 2022-10-13 ENCOUNTER — PATIENT MESSAGE (OUTPATIENT)
Dept: FAMILY MEDICINE | Facility: CLINIC | Age: 42
End: 2022-10-13

## 2022-10-13 ENCOUNTER — HOSPITAL ENCOUNTER (OUTPATIENT)
Dept: RADIOLOGY | Facility: HOSPITAL | Age: 42
Discharge: HOME | End: 2022-10-13
Attending: FAMILY MEDICINE

## 2022-10-13 DIAGNOSIS — R10.30 LOWER ABDOMINAL PAIN: ICD-10-CM

## 2022-10-13 DIAGNOSIS — R10.30 LOWER ABDOMINAL PAIN: Primary | ICD-10-CM

## 2022-10-13 PROCEDURE — 76705 ECHO EXAM OF ABDOMEN: CPT | Mod: TC

## 2022-10-13 PROCEDURE — 76705 US ABDOMEN LIMITED_HERNIA: ICD-10-PCS | Mod: 26,,, | Performed by: RADIOLOGY

## 2022-10-13 PROCEDURE — 76705 ECHO EXAM OF ABDOMEN: CPT | Mod: 26,,, | Performed by: RADIOLOGY

## 2022-10-24 ENCOUNTER — PATIENT MESSAGE (OUTPATIENT)
Dept: FAMILY MEDICINE | Facility: CLINIC | Age: 42
End: 2022-10-24

## 2023-01-11 ENCOUNTER — TELEPHONE (OUTPATIENT)
Dept: FAMILY MEDICINE | Facility: CLINIC | Age: 43
End: 2023-01-11

## 2023-01-11 NOTE — TELEPHONE ENCOUNTER
----- Message from Kaelyn Jang sent at 1/11/2023 10:15 AM CST -----  Contact: Vanessa  Type: Needs Medical Advice    Who Called: Vanessa/ [facility]  Best Call Back Number: [phone]  Additional  Information: Asking for a call back regarding getting a letter showing pt in ability to work. Will  be faxing an example letter for dr to go by asking if they get letter back today   Please Advise- Thank you

## 2023-10-04 ENCOUNTER — TELEPHONE (OUTPATIENT)
Dept: FAMILY MEDICINE | Facility: CLINIC | Age: 43
End: 2023-10-04

## 2023-10-04 NOTE — TELEPHONE ENCOUNTER
----- Message from Ileana Stanton sent at 10/4/2023 12:19 PM CDT -----  Regarding: Refill  Type:  RX Refill Request    Who Called: jonathan/ [facility]   Refill or New Rx:refill     RX Name and Strength:sertraline (ZOLOFT) 100 MG tablet  How is the patient currently taking it? (ex. 1XDay):as directed     Is this a 30 day or 90 day RX:90   Preferred Pharmacy with phone number:      [facility] [place] [address] [place] [address] [address] [facility] [address]  Phone: [phone] Fax: [phone]          Local or Mail Order:local   Ordering Provider:thai Morris Call Back Number:[phone]  Additional Information: Requesting call back once sent to the pharmacy  please advise thank you

## 2024-07-09 ENCOUNTER — PATIENT MESSAGE (OUTPATIENT)
Dept: ADMINISTRATIVE | Facility: HOSPITAL | Age: 44
End: 2024-07-09

## 2025-08-20 ENCOUNTER — PATIENT MESSAGE (OUTPATIENT)
Dept: ADMINISTRATIVE | Facility: HOSPITAL | Age: 45
End: 2025-08-20